# Patient Record
Sex: MALE | Race: OTHER | HISPANIC OR LATINO | URBAN - METROPOLITAN AREA
[De-identification: names, ages, dates, MRNs, and addresses within clinical notes are randomized per-mention and may not be internally consistent; named-entity substitution may affect disease eponyms.]

---

## 2017-10-13 ENCOUNTER — EMERGENCY (EMERGENCY)
Facility: HOSPITAL | Age: 52
LOS: 1 days | Discharge: HOME | End: 2017-10-13
Admitting: EMERGENCY MEDICINE
Payer: MEDICAID

## 2017-10-13 VITALS
TEMPERATURE: 98 F | HEART RATE: 61 BPM | DIASTOLIC BLOOD PRESSURE: 84 MMHG | SYSTOLIC BLOOD PRESSURE: 151 MMHG | RESPIRATION RATE: 18 BRPM | OXYGEN SATURATION: 98 %

## 2017-10-13 DIAGNOSIS — I25.10 ATHEROSCLEROTIC HEART DISEASE OF NATIVE CORONARY ARTERY WITHOUT ANGINA PECTORIS: ICD-10-CM

## 2017-10-13 DIAGNOSIS — K57.92 DIVERTICULITIS OF INTESTINE, PART UNSPECIFIED, WITHOUT PERFORATION OR ABSCESS WITHOUT BLEEDING: ICD-10-CM

## 2017-10-13 DIAGNOSIS — N23 UNSPECIFIED RENAL COLIC: ICD-10-CM

## 2017-10-13 DIAGNOSIS — Z95.5 PRESENCE OF CORONARY ANGIOPLASTY IMPLANT AND GRAFT: Chronic | ICD-10-CM

## 2017-10-13 DIAGNOSIS — I10 ESSENTIAL (PRIMARY) HYPERTENSION: ICD-10-CM

## 2017-10-13 DIAGNOSIS — F17.200 NICOTINE DEPENDENCE, UNSPECIFIED, UNCOMPLICATED: ICD-10-CM

## 2017-10-13 DIAGNOSIS — R10.32 LEFT LOWER QUADRANT PAIN: ICD-10-CM

## 2017-10-13 DIAGNOSIS — E78.5 HYPERLIPIDEMIA, UNSPECIFIED: ICD-10-CM

## 2017-10-13 LAB
ALBUMIN SERPL ELPH-MCNC: 3.9 G/DL — SIGNIFICANT CHANGE UP (ref 3.4–5)
ALP SERPL-CCNC: 66 U/L — SIGNIFICANT CHANGE UP (ref 40–120)
ALT FLD-CCNC: 27 U/L — SIGNIFICANT CHANGE UP (ref 12–42)
ANION GAP SERPL CALC-SCNC: 4 MMOL/L — LOW (ref 9–16)
AST SERPL-CCNC: 19 U/L — SIGNIFICANT CHANGE UP (ref 15–37)
BILIRUB SERPL-MCNC: 0.5 MG/DL — SIGNIFICANT CHANGE UP (ref 0.2–1.2)
BUN SERPL-MCNC: 12 MG/DL — SIGNIFICANT CHANGE UP (ref 7–23)
CALCIUM SERPL-MCNC: 8.9 MG/DL — SIGNIFICANT CHANGE UP (ref 8.5–10.5)
CHLORIDE SERPL-SCNC: 105 MMOL/L — SIGNIFICANT CHANGE UP (ref 96–108)
CO2 SERPL-SCNC: 30 MMOL/L — SIGNIFICANT CHANGE UP (ref 22–31)
CREAT SERPL-MCNC: 1.15 MG/DL — SIGNIFICANT CHANGE UP (ref 0.5–1.3)
GLUCOSE SERPL-MCNC: 126 MG/DL — HIGH (ref 70–99)
HCT VFR BLD CALC: 45.5 % — SIGNIFICANT CHANGE UP (ref 39–50)
HGB BLD-MCNC: 14.8 G/DL — SIGNIFICANT CHANGE UP (ref 13–17)
MCHC RBC-ENTMCNC: 30.7 PG — SIGNIFICANT CHANGE UP (ref 27–34)
MCHC RBC-ENTMCNC: 32.5 G/DL — SIGNIFICANT CHANGE UP (ref 32–36)
MCV RBC AUTO: 94.4 FL — SIGNIFICANT CHANGE UP (ref 80–100)
PLATELET # BLD AUTO: 251 K/UL — SIGNIFICANT CHANGE UP (ref 150–400)
POTASSIUM SERPL-MCNC: 4.5 MMOL/L — SIGNIFICANT CHANGE UP (ref 3.5–5.3)
POTASSIUM SERPL-SCNC: 4.5 MMOL/L — SIGNIFICANT CHANGE UP (ref 3.5–5.3)
PROT SERPL-MCNC: 7.8 G/DL — SIGNIFICANT CHANGE UP (ref 6.4–8.2)
RBC # BLD: 4.82 M/UL — SIGNIFICANT CHANGE UP (ref 4.2–5.8)
RBC # FLD: 14.2 % — SIGNIFICANT CHANGE UP (ref 10.3–16.9)
SODIUM SERPL-SCNC: 139 MMOL/L — SIGNIFICANT CHANGE UP (ref 132–145)
WBC # BLD: 10.2 K/UL — SIGNIFICANT CHANGE UP (ref 3.8–10.5)
WBC # FLD AUTO: 10.2 K/UL — SIGNIFICANT CHANGE UP (ref 3.8–10.5)

## 2017-10-13 PROCEDURE — 99285 EMERGENCY DEPT VISIT HI MDM: CPT

## 2017-10-13 PROCEDURE — 74176 CT ABD & PELVIS W/O CONTRAST: CPT | Mod: 26

## 2017-10-13 RX ORDER — ONDANSETRON 8 MG/1
1 TABLET, FILM COATED ORAL
Qty: 12 | Refills: 0 | OUTPATIENT
Start: 2017-10-13

## 2017-10-13 RX ORDER — TAMSULOSIN HYDROCHLORIDE 0.4 MG/1
0.4 CAPSULE ORAL ONCE
Qty: 0 | Refills: 0 | Status: COMPLETED | OUTPATIENT
Start: 2017-10-13 | End: 2017-10-13

## 2017-10-13 RX ORDER — OXYCODONE AND ACETAMINOPHEN 5; 325 MG/1; MG/1
1 TABLET ORAL ONCE
Qty: 0 | Refills: 0 | Status: DISCONTINUED | OUTPATIENT
Start: 2017-10-13 | End: 2017-10-13

## 2017-10-13 RX ORDER — TAMSULOSIN HYDROCHLORIDE 0.4 MG/1
1 CAPSULE ORAL
Qty: 15 | Refills: 0
Start: 2017-10-13 | End: 2017-11-12

## 2017-10-13 RX ORDER — ONDANSETRON 8 MG/1
4 TABLET, FILM COATED ORAL ONCE
Qty: 0 | Refills: 0 | Status: COMPLETED | OUTPATIENT
Start: 2017-10-13 | End: 2017-10-13

## 2017-10-13 RX ORDER — KETOROLAC TROMETHAMINE 30 MG/ML
30 SYRINGE (ML) INJECTION ONCE
Qty: 0 | Refills: 0 | Status: DISCONTINUED | OUTPATIENT
Start: 2017-10-13 | End: 2017-10-13

## 2017-10-13 RX ORDER — SODIUM CHLORIDE 9 MG/ML
1000 INJECTION INTRAMUSCULAR; INTRAVENOUS; SUBCUTANEOUS ONCE
Qty: 0 | Refills: 0 | Status: COMPLETED | OUTPATIENT
Start: 2017-10-13 | End: 2017-10-13

## 2017-10-13 RX ORDER — METRONIDAZOLE 500 MG
500 TABLET ORAL ONCE
Qty: 0 | Refills: 0 | Status: COMPLETED | OUTPATIENT
Start: 2017-10-13 | End: 2017-10-13

## 2017-10-13 RX ORDER — METRONIDAZOLE 500 MG
1 TABLET ORAL
Qty: 21 | Refills: 0
Start: 2017-10-13 | End: 2017-10-20

## 2017-10-13 RX ADMIN — Medication 500 MILLIGRAM(S): at 15:41

## 2017-10-13 RX ADMIN — ONDANSETRON 4 MILLIGRAM(S): 8 TABLET, FILM COATED ORAL at 15:01

## 2017-10-13 RX ADMIN — Medication 30 MILLIGRAM(S): at 15:02

## 2017-10-13 RX ADMIN — TAMSULOSIN HYDROCHLORIDE 0.4 MILLIGRAM(S): 0.4 CAPSULE ORAL at 15:01

## 2017-10-13 RX ADMIN — SODIUM CHLORIDE 2000 MILLILITER(S): 9 INJECTION INTRAMUSCULAR; INTRAVENOUS; SUBCUTANEOUS at 15:02

## 2017-10-13 RX ADMIN — OXYCODONE AND ACETAMINOPHEN 1 TABLET(S): 5; 325 TABLET ORAL at 15:41

## 2017-10-13 NOTE — ED PROVIDER NOTE - PHYSICAL EXAMINATION
Gen - WDWN M, writhing in pain, no respiratory distress    Skin - warm, dry, intact  HEENT - AT/NC, PERRL, EOMI, no conjunctival injection, o/p clear with no erythema, edema, or exudate, uvula midline, airway patent, neck supple, NT, FROM   CV - S1S2, R/R/R  Resp - respiration non-labored, CTAB, symmetric bs b/l, no r/r/w  GI - NABS, soft, ND, NT, no rebound or guarding, +L CVAT   MS - w/w/p, no c/c/e, calves supple and NT, distal pulses symmetric b/l   Neuro - AxOx3, no focal neuro deficits, ambulatory without gait disturbance Gen - WDWN M, writhing in pain, no respiratory distress    Skin - warm, dry, intact  HEENT - AT/NC, PERRL, EOMI, no conjunctival injection, o/p clear with no erythema, edema, or exudate, uvula midline, airway patent, neck supple, NT, FROM   CV - S1S2, R/R/R  Resp - respiration non-labored, CTAB, symmetric bs b/l, no r/r/w  GI - NABS, soft, ND, LLQ with TTP, no rebound or guarding, +L CVAT   MS - w/w/p, no c/c/e, calves supple and NT, distal pulses symmetric b/l   Neuro - AxOx3, no focal neuro deficits, ambulatory without gait disturbance

## 2017-10-13 NOTE — ED PROVIDER NOTE - MEDICAL DECISION MAKING DETAILS
pt with acute onset of L CVAT and LLQ pain today, AFVSS, labs unremarkable, CT with +0.2cm L distal urethral stone with acute diverticulitis without perforation, last C-scopy 3 yrs ago with unremarkable findings, pain adequately controlled in the ED, pt non-toxic appearing and hemodynamically stable, results, ddx, and f/u plans discussed with pt at bedside, d/c'd home to f/u with PMD, GI and urology, strict return precautions discussed, prompt return to ER for any worsening or new sx, pt verbalized understanding.

## 2017-10-13 NOTE — ED PROVIDER NOTE - OBJECTIVE STATEMENT
51 yo M with PMHx of MI, s/p stents on plavix, HTN, HLD, renal stones, requiring lithotripsy 10 yrs ago, bipolar d/o,  presenting c/o L flank pain and dysuria since this morning.  Pt woke up with acute onset of sharp pain in the L flank region, radiating to the LLQ with associated dysuria.  Denies fever, chills, hematuria, penile d/c, abdominal pain, change in bowel function, rash, HA, dizziness, SOB, CP, palpitations, diaphoresis, cough, and malaise. 53 yo M with PMHx of MI, s/p stents on plavix, HTN, HLD, renal stones, requiring lithotripsy 10 yrs ago, bipolar d/o,  presenting c/o L flank pain and dysuria since this morning.  Pt woke up with acute onset of sharp pain in the L flank region, radiating to the LLQ with associated dysuria.  Denies fever, chills, hematuria, penile d/c, abdominal pain, change in bowel function, rash, HA, dizziness, SOB, CP, palpitations, diaphoresis, cough, and malaise. Last EGD/C-scopy 3 yrs ago with unremarkable findings

## 2017-10-13 NOTE — ED BEHAVIORAL HEALTH NOTE - BEHAVIORAL HEALTH NOTE
Sw meet with patient to discuss options of health home services. However at this time the patient has declined services. worker made available for any further assistance needed.

## 2017-10-15 PROBLEM — Z00.00 ENCOUNTER FOR PREVENTIVE HEALTH EXAMINATION: Status: ACTIVE | Noted: 2017-10-15

## 2018-01-09 ENCOUNTER — APPOINTMENT (OUTPATIENT)
Dept: DERMATOLOGY | Facility: CLINIC | Age: 53
End: 2018-01-09

## 2018-03-01 ENCOUNTER — OUTPATIENT (OUTPATIENT)
Dept: OUTPATIENT SERVICES | Facility: HOSPITAL | Age: 53
LOS: 1 days | Discharge: HOME | End: 2018-03-01

## 2018-03-01 DIAGNOSIS — Z95.5 PRESENCE OF CORONARY ANGIOPLASTY IMPLANT AND GRAFT: Chronic | ICD-10-CM

## 2018-03-01 DIAGNOSIS — I10 ESSENTIAL (PRIMARY) HYPERTENSION: ICD-10-CM

## 2018-03-01 DIAGNOSIS — I25.10 ATHEROSCLEROTIC HEART DISEASE OF NATIVE CORONARY ARTERY WITHOUT ANGINA PECTORIS: ICD-10-CM

## 2018-04-10 ENCOUNTER — APPOINTMENT (OUTPATIENT)
Dept: DERMATOLOGY | Facility: CLINIC | Age: 53
End: 2018-04-10

## 2021-07-01 ENCOUNTER — OUTPATIENT (OUTPATIENT)
Dept: OUTPATIENT SERVICES | Facility: HOSPITAL | Age: 56
LOS: 1 days | End: 2021-07-01
Payer: MEDICAID

## 2021-07-01 DIAGNOSIS — Z95.5 PRESENCE OF CORONARY ANGIOPLASTY IMPLANT AND GRAFT: Chronic | ICD-10-CM

## 2021-07-07 ENCOUNTER — INPATIENT (INPATIENT)
Facility: HOSPITAL | Age: 56
LOS: 1 days | Discharge: HOME HEALTH | End: 2021-07-09
Attending: INTERNAL MEDICINE | Admitting: INTERNAL MEDICINE
Payer: MEDICAID

## 2021-07-07 VITALS — RESPIRATION RATE: 18 BRPM | TEMPERATURE: 98 F | OXYGEN SATURATION: 99 %

## 2021-07-07 DIAGNOSIS — Z95.5 PRESENCE OF CORONARY ANGIOPLASTY IMPLANT AND GRAFT: Chronic | ICD-10-CM

## 2021-07-07 LAB
ALBUMIN SERPL ELPH-MCNC: 4.1 G/DL — SIGNIFICANT CHANGE UP (ref 3.5–5.2)
ALP SERPL-CCNC: 69 U/L — SIGNIFICANT CHANGE UP (ref 30–115)
ALT FLD-CCNC: 25 U/L — SIGNIFICANT CHANGE UP (ref 0–41)
ANION GAP SERPL CALC-SCNC: 7 MMOL/L — SIGNIFICANT CHANGE UP (ref 7–14)
APTT BLD: 32.8 SEC — SIGNIFICANT CHANGE UP (ref 27–39.2)
APTT BLD: 36.4 SEC — SIGNIFICANT CHANGE UP (ref 27–39.2)
AST SERPL-CCNC: 19 U/L — SIGNIFICANT CHANGE UP (ref 0–41)
BASOPHILS # BLD AUTO: 0.04 K/UL — SIGNIFICANT CHANGE UP (ref 0–0.2)
BASOPHILS NFR BLD AUTO: 0.4 % — SIGNIFICANT CHANGE UP (ref 0–1)
BILIRUB SERPL-MCNC: 0.3 MG/DL — SIGNIFICANT CHANGE UP (ref 0.2–1.2)
BUN SERPL-MCNC: 10 MG/DL — SIGNIFICANT CHANGE UP (ref 10–20)
CALCIUM SERPL-MCNC: 9.1 MG/DL — SIGNIFICANT CHANGE UP (ref 8.5–10.1)
CHLORIDE SERPL-SCNC: 103 MMOL/L — SIGNIFICANT CHANGE UP (ref 98–110)
CO2 SERPL-SCNC: 28 MMOL/L — SIGNIFICANT CHANGE UP (ref 17–32)
CREAT SERPL-MCNC: 1 MG/DL — SIGNIFICANT CHANGE UP (ref 0.7–1.5)
EOSINOPHIL # BLD AUTO: 0.19 K/UL — SIGNIFICANT CHANGE UP (ref 0–0.7)
EOSINOPHIL NFR BLD AUTO: 1.9 % — SIGNIFICANT CHANGE UP (ref 0–8)
GLUCOSE BLDC GLUCOMTR-MCNC: 99 MG/DL — SIGNIFICANT CHANGE UP (ref 70–99)
GLUCOSE SERPL-MCNC: 101 MG/DL — HIGH (ref 70–99)
HCT VFR BLD CALC: 45.5 % — SIGNIFICANT CHANGE UP (ref 42–52)
HGB BLD-MCNC: 14.5 G/DL — SIGNIFICANT CHANGE UP (ref 14–18)
IMM GRANULOCYTES NFR BLD AUTO: 1.1 % — HIGH (ref 0.1–0.3)
INR BLD: 1.01 RATIO — SIGNIFICANT CHANGE UP (ref 0.65–1.3)
LYMPHOCYTES # BLD AUTO: 2.92 K/UL — SIGNIFICANT CHANGE UP (ref 1.2–3.4)
LYMPHOCYTES # BLD AUTO: 28.9 % — SIGNIFICANT CHANGE UP (ref 20.5–51.1)
MAGNESIUM SERPL-MCNC: 2 MG/DL — SIGNIFICANT CHANGE UP (ref 1.8–2.4)
MCHC RBC-ENTMCNC: 29.7 PG — SIGNIFICANT CHANGE UP (ref 27–31)
MCHC RBC-ENTMCNC: 31.9 G/DL — LOW (ref 32–37)
MCV RBC AUTO: 93 FL — SIGNIFICANT CHANGE UP (ref 80–94)
MONOCYTES # BLD AUTO: 0.79 K/UL — HIGH (ref 0.1–0.6)
MONOCYTES NFR BLD AUTO: 7.8 % — SIGNIFICANT CHANGE UP (ref 1.7–9.3)
NEUTROPHILS # BLD AUTO: 6.04 K/UL — SIGNIFICANT CHANGE UP (ref 1.4–6.5)
NEUTROPHILS NFR BLD AUTO: 59.9 % — SIGNIFICANT CHANGE UP (ref 42.2–75.2)
NRBC # BLD: 0 /100 WBCS — SIGNIFICANT CHANGE UP (ref 0–0)
NT-PROBNP SERPL-SCNC: 109 PG/ML — SIGNIFICANT CHANGE UP (ref 0–300)
PLATELET # BLD AUTO: 323 K/UL — SIGNIFICANT CHANGE UP (ref 130–400)
POTASSIUM SERPL-MCNC: 4.6 MMOL/L — SIGNIFICANT CHANGE UP (ref 3.5–5)
POTASSIUM SERPL-SCNC: 4.6 MMOL/L — SIGNIFICANT CHANGE UP (ref 3.5–5)
PROT SERPL-MCNC: 7 G/DL — SIGNIFICANT CHANGE UP (ref 6–8)
PROTHROM AB SERPL-ACNC: 11.6 SEC — SIGNIFICANT CHANGE UP (ref 9.95–12.87)
RBC # BLD: 4.89 M/UL — SIGNIFICANT CHANGE UP (ref 4.7–6.1)
RBC # FLD: 13.1 % — SIGNIFICANT CHANGE UP (ref 11.5–14.5)
SARS-COV-2 RNA SPEC QL NAA+PROBE: SIGNIFICANT CHANGE UP
SODIUM SERPL-SCNC: 138 MMOL/L — SIGNIFICANT CHANGE UP (ref 135–146)
TROPONIN T SERPL-MCNC: 0.01 NG/ML — SIGNIFICANT CHANGE UP
TROPONIN T SERPL-MCNC: <0.01 NG/ML — SIGNIFICANT CHANGE UP
WBC # BLD: 10.09 K/UL — SIGNIFICANT CHANGE UP (ref 4.8–10.8)
WBC # FLD AUTO: 10.09 K/UL — SIGNIFICANT CHANGE UP (ref 4.8–10.8)

## 2021-07-07 PROCEDURE — 92928 PRQ TCAT PLMT NTRAC ST 1 LES: CPT | Mod: RC,59

## 2021-07-07 PROCEDURE — 99222 1ST HOSP IP/OBS MODERATE 55: CPT

## 2021-07-07 PROCEDURE — 71045 X-RAY EXAM CHEST 1 VIEW: CPT | Mod: 26

## 2021-07-07 PROCEDURE — 99291 CRITICAL CARE FIRST HOUR: CPT

## 2021-07-07 PROCEDURE — 93458 L HRT ARTERY/VENTRICLE ANGIO: CPT | Mod: 26,XU

## 2021-07-07 PROCEDURE — 99222 1ST HOSP IP/OBS MODERATE 55: CPT | Mod: 57

## 2021-07-07 RX ORDER — HEPARIN SODIUM 5000 [USP'U]/ML
INJECTION INTRAVENOUS; SUBCUTANEOUS
Qty: 25000 | Refills: 0 | Status: DISCONTINUED | OUTPATIENT
Start: 2021-07-07 | End: 2021-07-07

## 2021-07-07 RX ORDER — CHLORHEXIDINE GLUCONATE 213 G/1000ML
1 SOLUTION TOPICAL
Refills: 0 | Status: DISCONTINUED | OUTPATIENT
Start: 2021-07-07 | End: 2021-07-09

## 2021-07-07 RX ORDER — METOPROLOL TARTRATE 50 MG
25 TABLET ORAL EVERY 12 HOURS
Refills: 0 | Status: DISCONTINUED | OUTPATIENT
Start: 2021-07-07 | End: 2021-07-08

## 2021-07-07 RX ORDER — SODIUM CHLORIDE 9 MG/ML
1000 INJECTION INTRAMUSCULAR; INTRAVENOUS; SUBCUTANEOUS
Refills: 0 | Status: DISCONTINUED | OUTPATIENT
Start: 2021-07-07 | End: 2021-07-07

## 2021-07-07 RX ORDER — CLOPIDOGREL BISULFATE 75 MG/1
300 TABLET, FILM COATED ORAL ONCE
Refills: 0 | Status: COMPLETED | OUTPATIENT
Start: 2021-07-07 | End: 2021-07-07

## 2021-07-07 RX ORDER — ASPIRIN/CALCIUM CARB/MAGNESIUM 324 MG
81 TABLET ORAL DAILY
Refills: 0 | Status: DISCONTINUED | OUTPATIENT
Start: 2021-07-08 | End: 2021-07-09

## 2021-07-07 RX ORDER — NITROGLYCERIN 6.5 MG
5 CAPSULE, EXTENDED RELEASE ORAL
Qty: 50 | Refills: 0 | Status: DISCONTINUED | OUTPATIENT
Start: 2021-07-07 | End: 2021-07-07

## 2021-07-07 RX ORDER — ASPIRIN/CALCIUM CARB/MAGNESIUM 324 MG
81 TABLET ORAL DAILY
Refills: 0 | Status: DISCONTINUED | OUTPATIENT
Start: 2021-07-07 | End: 2021-07-07

## 2021-07-07 RX ORDER — LISINOPRIL 2.5 MG/1
2.5 TABLET ORAL DAILY
Refills: 0 | Status: DISCONTINUED | OUTPATIENT
Start: 2021-07-07 | End: 2021-07-09

## 2021-07-07 RX ORDER — SODIUM CHLORIDE 9 MG/ML
1000 INJECTION INTRAMUSCULAR; INTRAVENOUS; SUBCUTANEOUS
Refills: 0 | Status: DISCONTINUED | OUTPATIENT
Start: 2021-07-07 | End: 2021-07-09

## 2021-07-07 RX ORDER — ATORVASTATIN CALCIUM 80 MG/1
80 TABLET, FILM COATED ORAL AT BEDTIME
Refills: 0 | Status: DISCONTINUED | OUTPATIENT
Start: 2021-07-07 | End: 2021-07-09

## 2021-07-07 RX ORDER — PRASUGREL 5 MG/1
10 TABLET, FILM COATED ORAL DAILY
Refills: 0 | Status: DISCONTINUED | OUTPATIENT
Start: 2021-07-08 | End: 2021-07-09

## 2021-07-07 RX ORDER — TAMSULOSIN HYDROCHLORIDE 0.4 MG/1
0.4 CAPSULE ORAL AT BEDTIME
Refills: 0 | Status: DISCONTINUED | OUTPATIENT
Start: 2021-07-07 | End: 2021-07-09

## 2021-07-07 RX ADMIN — TAMSULOSIN HYDROCHLORIDE 0.4 MILLIGRAM(S): 0.4 CAPSULE ORAL at 23:45

## 2021-07-07 RX ADMIN — ATORVASTATIN CALCIUM 80 MILLIGRAM(S): 80 TABLET, FILM COATED ORAL at 23:43

## 2021-07-07 RX ADMIN — HEPARIN SODIUM 1700 UNIT(S)/HR: 5000 INJECTION INTRAVENOUS; SUBCUTANEOUS at 19:52

## 2021-07-07 RX ADMIN — SODIUM CHLORIDE 150 MILLILITER(S): 9 INJECTION INTRAMUSCULAR; INTRAVENOUS; SUBCUTANEOUS at 23:00

## 2021-07-07 RX ADMIN — CLOPIDOGREL BISULFATE 300 MILLIGRAM(S): 75 TABLET, FILM COATED ORAL at 17:49

## 2021-07-07 RX ADMIN — Medication 1.5 MICROGRAM(S)/MIN: at 17:49

## 2021-07-07 NOTE — H&P ADULT - ASSESSMENT
IMPRESSION:  UA/ACS  HTN  DLD  Hx of CAD s/p stent to RCA in 2011    PLAN:    CNS: Avoid sedation     HEENT:  Oral care    PULMONARY:  HOB @ 45 degrees, Keep SPO2>94%    CARDIOVASCULAR:    GI: GI prophylaxis                                          Feeding     RENAL:  F/u  lytes.  Correct as needed. accurate I/O    INFECTIOUS DISEASE:    HEMATOLOGICAL:  DVT prophylaxis.    ENDOCRINE:  Follow up FS.  Insulin protocol if needed.    MUSCULOSKELETAL:    CODE STATUS: FULL CODE    DISPOSITION: Pt requires continued monitoring in the MICU     IMPRESSION:  UA/ACS  HTN  DLD  Hx of CAD s/p stent to RCA in 2011  BPH    PLAN:    CNS: Avoid sedation     HEENT:  Oral care    PULMONARY:  HOB @ 45 degrees, Keep SPO2>94%    CARDIOVASCULAR:  - STEMI cancelled but due to active chest pain, ischemic changes on ECG and history of early CAD (and aspirin and statin stopped), will take the patient to the cath lab today  - Loading dose of aspirin 325mg  - Continue Plavix 75mg daily  - Start heparin drip; monitor PTT closely and keep in range of anticoagulation following ACS protocol  - Start Nitro drip for chest pain and HTN  - Admit to CCU  - Check cardiac enzymes, CMP  - Trend cardiac enzymes and check serial ECGs  - Start Atorvastatin 80mg daily  - Continue home dose of beta-blockers for now  - Further recommendations based on cardiac cath findings    GI: GI prophylaxis: Protonix         Feeding: NPO until cath     RENAL:  F/u  lytes.  Correct as needed. accurate I/O    INFECTIOUS DISEASE: No indications for Abx    HEMATOLOGICAL:  DVT prophylaxis on heparin gtt     ENDOCRINE:  Follow up FS.  Insulin protocol if needed.    MUSCULOSKELETAL: Bedrest for now     CODE STATUS: FULL CODE    DISPOSITION: CCU

## 2021-07-07 NOTE — ED PROVIDER NOTE - CLINICAL SUMMARY MEDICAL DECISION MAKING FREE TEXT BOX
a/p  56 yr old m that presents with chest pain   -pt called as a STEMI   -labs  -ekg  -cardiology at bedside  -asa, plavix, nitro, heparin   -admit to ccu

## 2021-07-07 NOTE — H&P ADULT - NSHPLABSRESULTS_GEN_ALL_CORE
14.5   10.09 )-----------( 323      ( 07 Jul 2021 17:32 )             45.5       07-07    138  |  103  |  10  ----------------------------<  101<H>  4.6   |  28  |  1.0    Ca    9.1      07 Jul 2021 17:32  Mg     2.0     07-07    TPro  7.0  /  Alb  4.1  /  TBili  0.3  /  DBili  x   /  AST  19  /  ALT  25  /  AlkPhos  69  07-07                  PT/INR - ( 07 Jul 2021 17:41 )   PT: 11.60 sec;   INR: 1.01 ratio         PTT - ( 07 Jul 2021 17:41 )  PTT:32.8 sec    Lactate Trend      CARDIAC MARKERS ( 07 Jul 2021 17:32 )  x     / <0.01 ng/mL / x     / x     / x            CAPILLARY BLOOD GLUCOSE

## 2021-07-07 NOTE — H&P ADULT - NSICDXPASTMEDICALHX_GEN_ALL_CORE_FT
Patient has been ordered for a coronary CTA and this imaging study is now complete. Practice breath hold returned a HR = 46. The coronary CTA imaging study process was reviewed with the patient and she was offered an opportunity to ask questions. Patient was agreeable to proceed. Patient did well during the imaging study. Patient required no additional beta blocker to manage heart rate. NTG SL was administered per protocol (See MAR). Patient will be returned to her inpatient room.       Thank you,    Javid Robertson, JOSE ALEJANDRON, RN PAST MEDICAL HISTORY:  CAD (coronary artery disease)     HLD (hyperlipidemia)     HTN (hypertension)     Renal stone

## 2021-07-07 NOTE — CHART NOTE - NSCHARTNOTEFT_GEN_A_CORE
Code STEMI called in the ED. I immediately responded to the code STEMI and evaluated the patient at bedside. ECG reviewed and case discussed with interventional cardiologist on call (Dr. Hansen). Code STEMI cancelled. However, will take patient to the cath lab tonight for active chest pain and ischemic changes. See consult note for details

## 2021-07-07 NOTE — ED PROVIDER NOTE - PROGRESS NOTE DETAILS
I was directly involved in the care of this patient. PA Fellow Tanvi note/plan reviewed and agreed. approved to ccu, admit to [facility], icu resident. per cards, give plavix, heparin ptt, nitro ptt. plan for cath tonight.

## 2021-07-07 NOTE — CONSULT NOTE ADULT - SUBJECTIVE AND OBJECTIVE BOX
HPI:  56 y.o male patient with PMH of CAD s/p PCI to RCA in 2011, HLD, HTN presented to the ED with a 1-hour history of chest pain.  History goes back to 1 hour prior to presentation when the patient suddenly developed chest pain described as severe chest tightness, retrosternal radiating to the right side, associated with diaphoresis, similar to the pain he had in 2011 when he had PCI.  In EMS, he was given full dose of aspirin and nitro with slight improvement of chest pain.  He denies shortness of breath, abdominal pain, or any other significant symptoms.  He reports that while he was in [facility] in 12/2020, he was taken off of aspirin and simvastatin (unclear why).     PAST MEDICAL & SURGICAL HISTORY  Renal stone    HTN (hypertension)    HLD (hyperlipidemia)    CAD (coronary artery disease)    History of coronary artery stent placement        FAMILY HISTORY:  FAMILY HISTORY:      SOCIAL HISTORY:  [X]smoker  []Alcohol  []Drug    ALLERGIES:  No Known Allergies      MEDICATIONS:  MEDICATIONS  (STANDING):  nitroglycerin  Infusion 5 MICROgram(s)/Min (1.5 mL/Hr) IV Continuous <Continuous>    MEDICATIONS  (PRN):      HOME MEDICATIONS:  Home Medications:      VITALS:   T(F): 98 (07-07 @ 17:30), Max: 98 (07-07 @ 17:30)  HR: --  BP: --  BP(mean): --  RR: 18 (07-07 @ 17:30) (18 - 18)  SpO2: 99% (07-07 @ 17:30) (99% - 99%)    I&O's Summary      REVIEW OF SYSTEMS:  CONSTITUTIONAL: No weakness, fevers or chills  EYES: No visual changes  ENT: No vertigo or throat pain   NECK: No pain or stiffness  RESPIRATORY: No cough, wheezing, hemoptysis; No shortness of breath  CARDIOVASCULAR: Chest pain as described in HPI  GASTROINTESTINAL: No abdominal or epigastric pain. No nausea, vomiting, or hematemesis; No diarrhea or constipation. No melena or hematochezia.  GENITOURINARY: No dysuria, frequency or hematuria  NEUROLOGICAL: No numbness or weakness  SKIN: No itching, no rashes  MSK: No pain    PHYSICAL EXAM:  NEURO: patient is awake , alert and oriented  GEN: Not in acute distress  NECK: no thyroid enlargement, no JVD  LUNGS: Clear to auscultation bilaterally   CARDIOVASCULAR: S1/S2 present, RRR  ABD: Soft, non-tender, non-distended  EXT: No TACO  SKIN: Intact    LABS:                        14.5   10.09 )-----------( 323      ( 07 Jul 2021 17:32 )             45.5                     Troponin trend:            RADIOLOGY:  -CXR:  -TTE in 2011: EF 55-65%  -CCTA:  -STRESS TEST:  -CATHETERIZATION in 2011: distal RCA 90% stenosis s/p PCI with 3.0x28 Promus RX CHRIS, RPDA 80% stenosis s/p PCI with 2.75 x 18 Promus RX CHRIS    ECG: NSR, inferior q waves, slight ST elevation in inferior leads

## 2021-07-07 NOTE — ED PROVIDER NOTE - OBJECTIVE STATEMENT
57 yo male, pmh of cad s/p stents, htn, hld, presents to ed for right sided cp, started 20 mins ago, mild, aching, no radiation, got asa 325 and 2 nitro strips in field. denies fever, chills, sob, le swelling, abd pain, nvd, dizziness. pt rx plavix.

## 2021-07-07 NOTE — H&P ADULT - HISTORY OF PRESENT ILLNESS
This is a 56 year old male patient with PMHx of CAD s/p PCI to RCA in 2011, HLD, HTN presented to the ED with a 1-hour history of chest pain. History goes back to 1 hour prior to presentation when the patient suddenly developed chest pain described as severe chest tightness, retrosternal radiating to the right side, associated with diaphoresis, similar to the pain he had in 2011 when he had PCI. In EMS, he was given full dose of aspirin and nitro with slight improvement of chest pain.  He denies shortness of breath, abdominal pain, or any other significant symptoms. He reports that while he was in assisted in 12/2020, he was taken off of aspirin and simvastatin at that time and did not restart taking these medications after he was released.     Patient was evaluated in the ED, still complaining of chest tightness ( slightly improved after starting the nitro drip ).     In the ED Vital Signs Last 24 Hrs  T(C): 36.7 (07 Jul 2021 17:30), Max: 36.7 (07 Jul 2021 17:30)  T(F): 98 (07 Jul 2021 17:30), Max: 98 (07 Jul 2021 17:30)  BP(mean): --  RR: 18 (07 Jul 2021 17:30) (18 - 18)  SpO2: 99% (07 Jul 2021 17:30) (99% - 99%)

## 2021-07-07 NOTE — H&P ADULT - NSHPPHYSICALEXAM_GEN_ALL_CORE
General: in mild distress due to chest pain   Head and neck: NC, AT, No JVD  Heart: S1,S2 appreciated, no added sounds  Lungs: clear bilaterally, no added sounds  Abdomen: soft, nondistended, nontender  LE: no edema  Neuro: nonfocal

## 2021-07-07 NOTE — CONSULT NOTE ADULT - ASSESSMENT
56 y.o male patient with PMH of CAD s/p PCI to RCA in 2011, HLD, HTN presented to the ED with a 1-hour history of chest pain.    # Chest pain  - STEMI cancelled but due to active chest pain, ischemic changes on ECG and history of early CAD (and aspirin and statin stopped recently), will take the patient to the cath lab today  - Loading dose of aspirin 325mg  - Continue Plavix 75mg daily  - Start heparin drip; monitor PTT closely and keep in range of anticoagulation following ACS protocol  - Start Nitro drip for chest pain and HTN  - Admit to CCU  - Check cardiac enzymes, CMP  - Patient with allergy to contrast; give Prednisone 60mg PO  - Trend cardiac enzymes and check serial ECGs  - Start Atorvastatin 80mg daily  - Continue home dose of beta-blockers for now  - Further recommendations based on cardiac cath findings 56 y.o male patient with PMH of CAD s/p PCI to RCA in 2011, HLD, HTN presented to the ED with a 1-hour history of chest pain.    # Chest pain  - STEMI cancelled but due to active chest pain, ischemic changes on ECG and history of early CAD (and aspirin and statin stopped), will take the patient to the cath lab today  - Loading dose of aspirin 325mg  - Continue Plavix 75mg daily  - Start heparin drip; monitor PTT closely and keep in range of anticoagulation following ACS protocol  - Start Nitro drip for chest pain and HTN  - Admit to CCU  - Check cardiac enzymes, CMP  - Trend cardiac enzymes and check serial ECGs  - Start Atorvastatin 80mg daily  - Continue home dose of beta-blockers for now  - Further recommendations based on cardiac cath findings

## 2021-07-07 NOTE — ED PROVIDER NOTE - CRITICAL CARE ATTENDING CONTRIBUTION TO CARE
56 yr old m w/ a pmh significant for CAD s/p stent, htn, hld, bipolar disorder who presents with chest pain. Pt states that the symptoms started a couple of hrs before presenting. Pt states that the pain is substernal and radiates to the R side. Pt denies any sob, nausea, vomiting, fevers, chills or any other medical complaints.     VITAL SIGNS: I have reviewed nursing notes and confirm.  CONSTITUTIONAL: non-toxic, well appearing  SKIN: no rash, no petechiae.  EYES: EOMI, pink conjunctiva, anicteric  ENT: tongue midline, no exudates, MMM  NECK: Supple; no meningismus, no JVD  CARD: RRR, no murmurs, equal radial pulses bilaterally 2+  RESP: CTAB, no respiratory distress  ABD: Soft, non-tender, non-distended, no peritoneal signs, no HSM, no CVA tenderness  EXT: Normal ROM x4. No edema. No calves tenderness  NEURO: Alert, oriented. CN2-12 intact, equal strength bilaterally.  PSYCH: Cooperative, appropriate.    a/p  56 yr old m that presents with chest pain   -pt called as a STEMI   -labs  -ekg  -cardiology at bedside  -asa, plavix, nitro, heparin   -admit to ccu

## 2021-07-08 PROBLEM — I25.10 ATHEROSCLEROTIC HEART DISEASE OF NATIVE CORONARY ARTERY WITHOUT ANGINA PECTORIS: Chronic | Status: ACTIVE | Noted: 2017-10-13

## 2021-07-08 PROBLEM — I10 ESSENTIAL (PRIMARY) HYPERTENSION: Chronic | Status: ACTIVE | Noted: 2017-10-13

## 2021-07-08 PROBLEM — E78.5 HYPERLIPIDEMIA, UNSPECIFIED: Chronic | Status: ACTIVE | Noted: 2017-10-13

## 2021-07-08 PROBLEM — N20.0 CALCULUS OF KIDNEY: Chronic | Status: ACTIVE | Noted: 2017-10-13

## 2021-07-08 LAB
A1C WITH ESTIMATED AVERAGE GLUCOSE RESULT: 6.6 % — HIGH (ref 4–5.6)
ALBUMIN SERPL ELPH-MCNC: 3.7 G/DL — SIGNIFICANT CHANGE UP (ref 3.5–5.2)
ALP SERPL-CCNC: 65 U/L — SIGNIFICANT CHANGE UP (ref 30–115)
ALT FLD-CCNC: 31 U/L — SIGNIFICANT CHANGE UP (ref 0–41)
ANION GAP SERPL CALC-SCNC: 9 MMOL/L — SIGNIFICANT CHANGE UP (ref 7–14)
APTT BLD: 172.5 SEC — CRITICAL HIGH (ref 27–39.2)
APTT BLD: 31.3 SEC — SIGNIFICANT CHANGE UP (ref 27–39.2)
AST SERPL-CCNC: 81 U/L — HIGH (ref 0–41)
BASOPHILS # BLD AUTO: 0.03 K/UL — SIGNIFICANT CHANGE UP (ref 0–0.2)
BASOPHILS NFR BLD AUTO: 0.3 % — SIGNIFICANT CHANGE UP (ref 0–1)
BILIRUB SERPL-MCNC: 0.5 MG/DL — SIGNIFICANT CHANGE UP (ref 0.2–1.2)
BUN SERPL-MCNC: 8 MG/DL — LOW (ref 10–20)
CALCIUM SERPL-MCNC: 8.6 MG/DL — SIGNIFICANT CHANGE UP (ref 8.5–10.1)
CHLORIDE SERPL-SCNC: 101 MMOL/L — SIGNIFICANT CHANGE UP (ref 98–110)
CHOLEST SERPL-MCNC: 195 MG/DL — SIGNIFICANT CHANGE UP
CO2 SERPL-SCNC: 24 MMOL/L — SIGNIFICANT CHANGE UP (ref 17–32)
COVID-19 SPIKE DOMAIN AB INTERP: POSITIVE
COVID-19 SPIKE DOMAIN ANTIBODY RESULT: >250 U/ML — HIGH
CREAT SERPL-MCNC: 0.9 MG/DL — SIGNIFICANT CHANGE UP (ref 0.7–1.5)
EOSINOPHIL # BLD AUTO: 0.1 K/UL — SIGNIFICANT CHANGE UP (ref 0–0.7)
EOSINOPHIL NFR BLD AUTO: 0.9 % — SIGNIFICANT CHANGE UP (ref 0–8)
ESTIMATED AVERAGE GLUCOSE: 143 MG/DL — HIGH (ref 68–114)
GLUCOSE SERPL-MCNC: 93 MG/DL — SIGNIFICANT CHANGE UP (ref 70–99)
HCT VFR BLD CALC: 43 % — SIGNIFICANT CHANGE UP (ref 42–52)
HCV AB S/CO SERPL IA: 0.03 COI — SIGNIFICANT CHANGE UP
HCV AB SERPL-IMP: SIGNIFICANT CHANGE UP
HDLC SERPL-MCNC: 30 MG/DL — LOW
HGB BLD-MCNC: 14.2 G/DL — SIGNIFICANT CHANGE UP (ref 14–18)
IMM GRANULOCYTES NFR BLD AUTO: 0.6 % — HIGH (ref 0.1–0.3)
LIPID PNL WITH DIRECT LDL SERPL: 127 MG/DL — HIGH
LYMPHOCYTES # BLD AUTO: 2.71 K/UL — SIGNIFICANT CHANGE UP (ref 1.2–3.4)
LYMPHOCYTES # BLD AUTO: 23.6 % — SIGNIFICANT CHANGE UP (ref 20.5–51.1)
MCHC RBC-ENTMCNC: 30.3 PG — SIGNIFICANT CHANGE UP (ref 27–31)
MCHC RBC-ENTMCNC: 33 G/DL — SIGNIFICANT CHANGE UP (ref 32–37)
MCV RBC AUTO: 91.9 FL — SIGNIFICANT CHANGE UP (ref 80–94)
MONOCYTES # BLD AUTO: 0.76 K/UL — HIGH (ref 0.1–0.6)
MONOCYTES NFR BLD AUTO: 6.6 % — SIGNIFICANT CHANGE UP (ref 1.7–9.3)
NEUTROPHILS # BLD AUTO: 7.82 K/UL — HIGH (ref 1.4–6.5)
NEUTROPHILS NFR BLD AUTO: 68 % — SIGNIFICANT CHANGE UP (ref 42.2–75.2)
NON HDL CHOLESTEROL: 165 MG/DL — HIGH
NRBC # BLD: 0 /100 WBCS — SIGNIFICANT CHANGE UP (ref 0–0)
PLATELET # BLD AUTO: 298 K/UL — SIGNIFICANT CHANGE UP (ref 130–400)
POTASSIUM SERPL-MCNC: 4.2 MMOL/L — SIGNIFICANT CHANGE UP (ref 3.5–5)
POTASSIUM SERPL-SCNC: 4.2 MMOL/L — SIGNIFICANT CHANGE UP (ref 3.5–5)
PROT SERPL-MCNC: 6.4 G/DL — SIGNIFICANT CHANGE UP (ref 6–8)
RBC # BLD: 4.68 M/UL — LOW (ref 4.7–6.1)
RBC # FLD: 13.2 % — SIGNIFICANT CHANGE UP (ref 11.5–14.5)
SARS-COV-2 IGG+IGM SERPL QL IA: >250 U/ML — HIGH
SARS-COV-2 IGG+IGM SERPL QL IA: POSITIVE
SODIUM SERPL-SCNC: 134 MMOL/L — LOW (ref 135–146)
TRIGL SERPL-MCNC: 299 MG/DL — HIGH
TROPONIN T SERPL-MCNC: 1.8 NG/ML — CRITICAL HIGH
TROPONIN T SERPL-MCNC: 1.99 NG/ML — CRITICAL HIGH
WBC # BLD: 11.49 K/UL — HIGH (ref 4.8–10.8)
WBC # FLD AUTO: 11.49 K/UL — HIGH (ref 4.8–10.8)

## 2021-07-08 PROCEDURE — 99231 SBSQ HOSP IP/OBS SF/LOW 25: CPT

## 2021-07-08 PROCEDURE — 93306 TTE W/DOPPLER COMPLETE: CPT | Mod: 26

## 2021-07-08 PROCEDURE — 99232 SBSQ HOSP IP/OBS MODERATE 35: CPT

## 2021-07-08 PROCEDURE — 93010 ELECTROCARDIOGRAM REPORT: CPT

## 2021-07-08 RX ORDER — CLOPIDOGREL BISULFATE 75 MG/1
1 TABLET, FILM COATED ORAL
Qty: 0 | Refills: 0 | DISCHARGE

## 2021-07-08 RX ORDER — METOPROLOL TARTRATE 50 MG
25 TABLET ORAL EVERY 8 HOURS
Refills: 0 | Status: DISCONTINUED | OUTPATIENT
Start: 2021-07-08 | End: 2021-07-09

## 2021-07-08 RX ORDER — PRASUGREL 5 MG/1
1 TABLET, FILM COATED ORAL
Qty: 30 | Refills: 1
Start: 2021-07-08 | End: 2021-09-05

## 2021-07-08 RX ORDER — ENOXAPARIN SODIUM 100 MG/ML
40 INJECTION SUBCUTANEOUS AT BEDTIME
Refills: 0 | Status: DISCONTINUED | OUTPATIENT
Start: 2021-07-08 | End: 2021-07-09

## 2021-07-08 RX ADMIN — ATORVASTATIN CALCIUM 80 MILLIGRAM(S): 80 TABLET, FILM COATED ORAL at 21:09

## 2021-07-08 RX ADMIN — Medication 25 MILLIGRAM(S): at 05:34

## 2021-07-08 RX ADMIN — PRASUGREL 10 MILLIGRAM(S): 5 TABLET, FILM COATED ORAL at 12:05

## 2021-07-08 RX ADMIN — Medication 81 MILLIGRAM(S): at 12:06

## 2021-07-08 RX ADMIN — TAMSULOSIN HYDROCHLORIDE 0.4 MILLIGRAM(S): 0.4 CAPSULE ORAL at 21:09

## 2021-07-08 RX ADMIN — CHLORHEXIDINE GLUCONATE 1 APPLICATION(S): 213 SOLUTION TOPICAL at 05:33

## 2021-07-08 RX ADMIN — ENOXAPARIN SODIUM 40 MILLIGRAM(S): 100 INJECTION SUBCUTANEOUS at 21:09

## 2021-07-08 RX ADMIN — Medication 25 MILLIGRAM(S): at 13:46

## 2021-07-08 RX ADMIN — LISINOPRIL 2.5 MILLIGRAM(S): 2.5 TABLET ORAL at 05:34

## 2021-07-08 RX ADMIN — Medication 25 MILLIGRAM(S): at 21:09

## 2021-07-08 NOTE — PROGRESS NOTE ADULT - ASSESSMENT
IMPRESSION:  Acute MI s/p PCI to RCA on 7/7/2021  HTN  DLD  Hx of CAD s/p stent to RCA in 2011  BPH    PLAN:    CNS: Avoid sedation     HEENT:  Oral care    PULMONARY:  HOB @ 45 degrees, Keep SPO2>94%    CARDIOVASCULAR:  - Continue CCU monitoring  - Aspirin 81mg daily, Prasugrel 10mg daily, Atorvastatin 80mg daily  - Increase Metoprolol to 25mg q8h  - Continue Lisinopril  - Check 2D echo  - Trend cardiac enzymes until stable / down trending  - F/U HbA1c    GI: GI prophylaxis: Protonix    Feeding    RENAL:  F/u renal function and lytes.  Correct as needed. accurate I/O    INFECTIOUS DISEASE: No indications for Abx. Monitor VS    HEMATOLOGICAL:  DVT prophylaxis    ENDOCRINE:  Follow up FS.  Insulin protocol if needed. F/U HbA1c    MUSCULOSKELETAL: Increase as tolerated    CODE STATUS: FULL CODE

## 2021-07-08 NOTE — PROGRESS NOTE ADULT - SUBJECTIVE AND OBJECTIVE BOX
PATIENT:  GLENN ARGUETA  [id number]    CHIEF COMPLAINT:  Patient is a 56y old  Male who presents with a chief complaint of chest pain (2021 18:51)      INTERVAL HISTORY/OVERNIGHT EVENTS:  Pt had an episode of vtach overnight, converted spontaneously, cath yesterday. Today no complaints, no chest pain, sob, abdominal pain, ha, nausea, vomiting, leg pain.      MEDICATIONS:  MEDICATIONS  (STANDING):  aspirin  chewable 81 milliGRAM(s) Oral daily  atorvastatin 80 milliGRAM(s) Oral at bedtime  chlorhexidine 4% Liquid 1 Application(s) Topical <User Schedule>  lisinopril Oral Tab/Cap - Peds 2.5 milliGRAM(s) Oral daily  metoprolol tartrate 25 milliGRAM(s) Oral every 12 hours  prasugrel 10 milliGRAM(s) Oral daily  sodium chloride 0.9%. 1000 milliLiter(s) (150 mL/Hr) IV Continuous <Continuous>  tamsulosin 0.4 milliGRAM(s) Oral at bedtime    MEDICATIONS  (PRN):      ALLERGIES:  Allergies    No Known Allergies    Intolerances        OBJECTIVE:  ICU Vital Signs Last 24 Hrs  T(C): 36.6 (2021 04:00), Max: 36.7 (2021 17:30)  T(F): 97.8 (2021 04:00), Max: 98 (2021 17:30)  HR: 78 (2021 07:00) (66 - 84)  BP: 155/102 (2021 07:00) (123/78 - 155/102)  BP(mean): 123 (2021 07:00) (85 - 126)  ABP: --  ABP(mean): --  RR: 18 (2021 07:00) (12 - 19)  SpO2: 98% (2021 07:00) (95% - 100%)      Adult Advanced Hemodynamics Last 24 Hrs  CVP(mm Hg): --  CVP(cm H2O): --  CO: --  CI: --  PA: --  PA(mean): --  PCWP: --  SVR: --  SVRI: --  PVR: --  PVRI: --  CAPILLARY BLOOD GLUCOSE      POCT Blood Glucose.: 99 mg/dL (2021 22:55)    CAPILLARY BLOOD GLUCOSE      POCT Blood Glucose.: 99 mg/dL (2021 22:55)    I&O's Summary    2021 07:01  -  2021 07:00  --------------------------------------------------------  IN: 1300 mL / OUT: 1230 mL / NET: 70 mL      Daily Height in cm: 175.26 (2021 22:55)    Daily Weight in k.5 (2021 05:00)    PHYSICAL EXAMINATION:  General: WN/WD NAD  HEENT: EOMI, moist mucous membranes  Neurology: A&Ox3, nonfocal  Respiratory: CTA B/L, normal respiratory effort, no wheezes, crackles, rales  CV: RRR, S1S2, no murmurs, rubs or gallops  Abdominal: Soft, NT, ND  Extremities: No edema, + peripheral pulses      LABS:                          14.2   11.49 )-----------( 298      ( 2021 04:39 )             43.0     08    134<L>  |  101  |  8<L>  ----------------------------<  93  4.2   |  24  |  0.9    Ca    8.6      2021 04:39  Mg     2.0         TPro  6.4  /  Alb  3.7  /  TBili  0.5  /  DBili  x   /  AST  81<H>  /  ALT  31  /  AlkPhos  65  0708    LIVER FUNCTIONS - ( 2021 04:39 )  Alb: 3.7 g/dL / Pro: 6.4 g/dL / ALK PHOS: 65 U/L / ALT: 31 U/L / AST: 81 U/L / GGT: x           PT/INR - ( 2021 17:41 )   PT: 11.60 sec;   INR: 1.01 ratio         PTT - ( 2021 04:39 )  PTT:31.3 sec  Troponin T, Serum: 1.80 ng/mL ( @ 04:39)  Troponin T, Serum: 0.01 ng/mL ( @ 20:16)  Troponin T, Serum: <0.01 ng/mL ( @ 17:32)    CARDIAC MARKERS ( 2021 04:39 )  x     / 1.80 ng/mL / x     / x     / x      CARDIAC MARKERS ( 2021 20:16 )  x     / 0.01 ng/mL / x     / x     / x      CARDIAC MARKERS ( 2021 17:32 )  x     / <0.01 ng/mL / x     / x     / x              TELEMETRY:     EKG:       IMAGING:  Echo:        LVSF:        EF:        RVSF:        LA:        RA:        Mitral Valve:        Aortic Valve:       Tricuspid Valve:        Pulmonic Valve:        Pericardium:     ASSESSMENT & PLAN:

## 2021-07-08 NOTE — PROGRESS NOTE ADULT - SUBJECTIVE AND OBJECTIVE BOX
Cardiology Follow up    GLENN ARGUETA   56y Male  PAST MEDICAL & SURGICAL HISTORY:  Renal stone    HTN (hypertension)    HLD (hyperlipidemia)    CAD (coronary artery disease)    History of coronary artery stent placement         HPI:  This is a 56 year old male patient with PMHx of CAD s/p PCI to RCA in 2011, HLD, HTN presented to the ED with a 1-hour history of chest pain. History goes back to 1 hour prior to presentation when the patient suddenly developed chest pain described as severe chest tightness, retrosternal radiating to the right side, associated with diaphoresis, similar to the pain he had in 2011 when he had PCI. In EMS, he was given full dose of aspirin and nitro with slight improvement of chest pain.  He denies shortness of breath, abdominal pain, or any other significant symptoms. He reports that while he was in nursing home in 12/2020, he was taken off of aspirin and simvastatin at that time and did not restart taking these medications after he was released.     Patient was evaluated in the ED, still complaining of chest tightness ( slightly improved after starting the nitro drip ).     In the ED Vital Signs Last 24 Hrs  T(C): 36.7 (07 Jul 2021 17:30), Max: 36.7 (07 Jul 2021 17:30)  T(F): 98 (07 Jul 2021 17:30), Max: 98 (07 Jul 2021 17:30)  BP(mean): --  RR: 18 (07 Jul 2021 17:30) (18 - 18)  SpO2: 99% (07 Jul 2021 17:30) (99% - 99%) (07 Jul 2021 18:51)    Allergies    No Known Allergies    Intolerances    Patient seen and examined at bedside. No acute events overnight.  Patient without complaints. Pt ambulated without issues/symptoms  Denies CP, SOB, palpitations, or dizziness  No events on telemetry overnight    Vital Signs Last 24 Hrs  T(C): 36.5 (08 Jul 2021 12:00), Max: 36.7 (07 Jul 2021 17:30)  T(F): 97.7 (08 Jul 2021 12:00), Max: 98.1 (08 Jul 2021 08:00)  HR: 78 (08 Jul 2021 12:00) (66 - 88)  BP: 140/83 (08 Jul 2021 12:00) (123/78 - 155/102)  BP(mean): 111 (08 Jul 2021 12:00) (85 - 126)  RR: 20 (08 Jul 2021 12:00) (12 - 23)  SpO2: 96% (08 Jul 2021 12:00) (95% - 100%)    MEDICATIONS  (STANDING):  aspirin  chewable 81 milliGRAM(s) Oral daily  atorvastatin 80 milliGRAM(s) Oral at bedtime  chlorhexidine 4% Liquid 1 Application(s) Topical <User Schedule>  enoxaparin Injectable 40 milliGRAM(s) SubCutaneous at bedtime  lisinopril Oral Tab/Cap - Peds 2.5 milliGRAM(s) Oral daily  metoprolol tartrate 25 milliGRAM(s) Oral every 8 hours  prasugrel 10 milliGRAM(s) Oral daily  sodium chloride 0.9%. 1000 milliLiter(s) (150 mL/Hr) IV Continuous <Continuous>  tamsulosin 0.4 milliGRAM(s) Oral at bedtime    MEDICATIONS  (PRN):    REVIEW OF SYSTEMS:          All negative except as mentioned in HPI    PHYSICAL EXAM:           CONSTITUTIONAL: Well-developed; well-nourished; in no acute distress  	SKIN: warm, dry  	HEAD: Normocephalic; atraumatic  	EYES: PERRL.  	ENT: No nasal discharge, airway clear, mucous membranes moist  	NECK: Supple; non tender.  	CARD: +S1, +S2, no murmurs, gallops, or rubs. Regular rate and rhythm    	RESP: No wheezes, rales or rhonchi. CTA B/L  	ABD: soft ntnd, + BS x 4 quadrants  	EXT: moves all extremities,  no clubbing, cyanosis or edema  	NEURO: Alert and oriented x3, no focal deficits          PSYCH: Cooperative, appropriate          VASCULAR:  + Rad / + PTs / +  DPs          EXTREMITY:              Right Radial: dressing removed, access site soft, no hematoma, no pain, + pulses, no sign of infection, no numbness            ECG:   < from: 12 Lead ECG (07.08.21 @ 05:08) >  Ventricular Rate 73 BPM    Atrial Rate 73 BPM    P-R Interval 174 ms    QRS Duration 90 ms    Q-T Interval 382 ms    QTC Calculation(Bazett) 420 ms    P Axis 72 degrees    R Axis -17 degrees    T Axis 59 degrees    Diagnosis Line Sinus rhythm with occasional Premature ventricular complexes with ventricular  escape complexes  Inferior infarct , age undetermined  Abnormal ECG    Confirmed by Royzman, Andrew ([id number]) on 7/8/2021 7:40:57 AM                                                                                    2D ECHO:  P    LABS:                        14.2   11.49 )-----------( 298      ( 08 Jul 2021 04:39 )             43.0     07-08    134<L>  |  101  |  8<L>  ----------------------------<  93  4.2   |  24  |  0.9    Ca    8.6      08 Jul 2021 04:39  Mg     2.0     07-07    TPro  6.4  /  Alb  3.7  /  TBili  0.5  /  DBili  x   /  AST  81<H>  /  ALT  31  /  AlkPhos  65  07-08    CARDIAC MARKERS ( 08 Jul 2021 04:39 )  x     / 1.80 ng/mL / x     / x     / x      CARDIAC MARKERS ( 07 Jul 2021 20:16 )  x     / 0.01 ng/mL / x     / x     / x      CARDIAC MARKERS ( 07 Jul 2021 17:32 )  x     / <0.01 ng/mL / x     / x     / x        LDL Cholesterol Calculated: 127 mg/dL   1C with Estimated Average Glucose Result: 6.6    Magnesium, Serum: 2.0 mg/dL [1.8 - 2.4] (07-07-21 @ 17:32)  LIVER FUNCTIONS - ( 08 Jul 2021 04:39 )  Alb: 3.7 g/dL / Pro: 6.4 g/dL / ALK PHOS: 65 U/L / ALT: 31 U/L / AST: 81 U/L / GGT: x             A/P:  I discussed the case with Cardiologist Dr. Hansen and recommend the following:    S/P PCI:   CORONARIES:    Dominance- right  Left main-  no disease   LAD- moderate atherosclerosis  Diagonal- mild disease   LCX- mild disease   Obtuse marginal- no disease   RCA- 80% diffuse stenosis in prox mid RCA. 100% thrombotic occlusion of distal RCA. Patent prior stent distal to the lesion.   RPDA- no disease. patent prior stent   RPL- no disease     PROCEDURE SUMMARY  Successful PCI of proximal, mid and distal RCA with CHRIS x 2 (AUC score 8)                      Care as per CCU team                               OOB to chair, ambulate with assistance                    f/u 2D Echo results   	     Continue DAPT ( Aspirin 81 mg PO Daily and Effient 10 mg PO Daily ), ACEi, B-Blocker, Statin Therapy                   Patient pharmacy called in for Effient prescription and it is 1$ per month; home Plavix D/C                    Monitor LFT's                   Keep K = 4, Mg = 2                   GI / DVT prophylaxis                    Patient agreeing to take DAPT for at least one year or as directed by cardiologist                    Pt given instructions on importance of taking antiplatelet medication or risk acute stent thrombosis/death                   Post cath instructions, access site care and activity restrictions reviewed with patient                     Discussed with patient to return to hospital if experience chest pain, shortness breath, dizziness and site bleeding                   Aggressive risk factor modification, diet counseling, smoking cessation discussed with patient                       Cardiac rehab information provided/ referral and communication to cardiac rehab provided                   Follow up with Cardiology Dr. Hansen regarding patient disposition.

## 2021-07-08 NOTE — PROGRESS NOTE ADULT - ATTENDING COMMENTS
cad/ami  s/p pci of rca with miguel a  cont dapt  2d echo  ambulate as tolerated  aggressive risk modif

## 2021-07-08 NOTE — CHART NOTE - NSCHARTNOTEFT_GEN_A_CORE
Preliminary Cardiac Catheterization Post-Procedure Report    PRE-OP DIAGNOSIS: ACS    PROCEDURE: Coronary angiogram, PCI    Attending: Dr. Hansen   Fellow: Dr. Sepulveda, Dr. Luis    ANESTHESIA TYPE  [  ]General Anesthesia  [ x ] Sedation  [ x ] Local/Regional    ESTIMATED BLOOD LOSS:   < 10 mL    CONDITION  [  ] Critical  [  ] Serious  [  ]Fair  [ x ]Good    ACCESS & HEMOSTASIS  [ x ] Right radial  -> D stat  [  ] Right femoral  [  ] Left radial  [  ] Left femoral       FINDINGS    LVEF- mildly elevated                         CORONARIES:    Dominance- right  Left main-  no disease   LAD- mild to moderate atherosclerosis  Diagonal- mild disease   LCX- mild disease   Obtuse marginal- no disease   RCA- 80% diffuse stenosis in prox mid RCA. 100% thrombotic occlusion of distal RCA. Patent prior stent distal to the lesion.   RPDA- no disease. patent prior stent   RPL- no disease       PROCEDURE SUMMARY  Successful PCI of proximal, mid and distal RCA with CHRIS x 2 (AUC score 8)      RECOMMENDATIONS  -CU monitoring   -IV hydration post procedure   -Aggressive medical therapy including DAPT and risk factor modification Preliminary Cardiac Catheterization Post-Procedure Report    PRE-OP DIAGNOSIS: ACS    PROCEDURE: Coronary angiogram, PCI    Attending: Dr. Hansen   Fellow: Dr. Sepulveda, Dr. Luis    ANESTHESIA TYPE  [  ]General Anesthesia  [ x ] Sedation  [ x ] Local/Regional    ESTIMATED BLOOD LOSS:   < 10 mL    CONDITION  [  ] Critical  [  ] Serious  [  ]Fair  [ x ]Good    ACCESS & HEMOSTASIS  [ x ] Right radial  -> D stat  [  ] Right femoral  [  ] Left radial  [  ] Left femoral       FINDINGS    LVEF- mildly elevated                         CORONARIES:    Dominance- right  Left main-  no disease   LAD- moderate atherosclerosis  Diagonal- mild disease   LCX- mild disease   Obtuse marginal- no disease   RCA- 80% diffuse stenosis in prox mid RCA. 100% thrombotic occlusion of distal RCA. Patent prior stent distal to the lesion.   RPDA- no disease. patent prior stent   RPL- no disease       PROCEDURE SUMMARY  Successful PCI of proximal, mid and distal RCA with CHRIS x 2 (AUC score 8)      RECOMMENDATIONS  -CU monitoring   -IV hydration post procedure   -Aggressive medical therapy including DAPT and risk factor modification

## 2021-07-09 ENCOUNTER — TRANSCRIPTION ENCOUNTER (OUTPATIENT)
Age: 56
End: 2021-07-09

## 2021-07-09 VITALS
DIASTOLIC BLOOD PRESSURE: 88 MMHG | RESPIRATION RATE: 20 BRPM | OXYGEN SATURATION: 97 % | HEART RATE: 84 BPM | SYSTOLIC BLOOD PRESSURE: 146 MMHG

## 2021-07-09 LAB
ALBUMIN SERPL ELPH-MCNC: 4 G/DL — SIGNIFICANT CHANGE UP (ref 3.5–5.2)
ALP SERPL-CCNC: 66 U/L — SIGNIFICANT CHANGE UP (ref 30–115)
ALT FLD-CCNC: 33 U/L — SIGNIFICANT CHANGE UP (ref 0–41)
ANION GAP SERPL CALC-SCNC: 10 MMOL/L — SIGNIFICANT CHANGE UP (ref 7–14)
AST SERPL-CCNC: 59 U/L — HIGH (ref 0–41)
BILIRUB SERPL-MCNC: 0.4 MG/DL — SIGNIFICANT CHANGE UP (ref 0.2–1.2)
BUN SERPL-MCNC: 11 MG/DL — SIGNIFICANT CHANGE UP (ref 10–20)
CALCIUM SERPL-MCNC: 9 MG/DL — SIGNIFICANT CHANGE UP (ref 8.5–10.1)
CHLORIDE SERPL-SCNC: 102 MMOL/L — SIGNIFICANT CHANGE UP (ref 98–110)
CO2 SERPL-SCNC: 27 MMOL/L — SIGNIFICANT CHANGE UP (ref 17–32)
CREAT SERPL-MCNC: 1 MG/DL — SIGNIFICANT CHANGE UP (ref 0.7–1.5)
GLUCOSE SERPL-MCNC: 96 MG/DL — SIGNIFICANT CHANGE UP (ref 70–99)
HCT VFR BLD CALC: 43.4 % — SIGNIFICANT CHANGE UP (ref 42–52)
HGB BLD-MCNC: 14.1 G/DL — SIGNIFICANT CHANGE UP (ref 14–18)
MAGNESIUM SERPL-MCNC: 1.9 MG/DL — SIGNIFICANT CHANGE UP (ref 1.8–2.4)
MCHC RBC-ENTMCNC: 30.5 PG — SIGNIFICANT CHANGE UP (ref 27–31)
MCHC RBC-ENTMCNC: 32.5 G/DL — SIGNIFICANT CHANGE UP (ref 32–37)
MCV RBC AUTO: 93.7 FL — SIGNIFICANT CHANGE UP (ref 80–94)
NRBC # BLD: 0 /100 WBCS — SIGNIFICANT CHANGE UP (ref 0–0)
PLATELET # BLD AUTO: 288 K/UL — SIGNIFICANT CHANGE UP (ref 130–400)
POTASSIUM SERPL-MCNC: 4.3 MMOL/L — SIGNIFICANT CHANGE UP (ref 3.5–5)
POTASSIUM SERPL-SCNC: 4.3 MMOL/L — SIGNIFICANT CHANGE UP (ref 3.5–5)
PROT SERPL-MCNC: 6.6 G/DL — SIGNIFICANT CHANGE UP (ref 6–8)
RBC # BLD: 4.63 M/UL — LOW (ref 4.7–6.1)
RBC # FLD: 13.2 % — SIGNIFICANT CHANGE UP (ref 11.5–14.5)
SODIUM SERPL-SCNC: 139 MMOL/L — SIGNIFICANT CHANGE UP (ref 135–146)
TROPONIN T SERPL-MCNC: 1.42 NG/ML — CRITICAL HIGH
WBC # BLD: 9.86 K/UL — SIGNIFICANT CHANGE UP (ref 4.8–10.8)
WBC # FLD AUTO: 9.86 K/UL — SIGNIFICANT CHANGE UP (ref 4.8–10.8)

## 2021-07-09 PROCEDURE — 93010 ELECTROCARDIOGRAM REPORT: CPT

## 2021-07-09 PROCEDURE — 99231 SBSQ HOSP IP/OBS SF/LOW 25: CPT

## 2021-07-09 RX ORDER — ATORVASTATIN CALCIUM 80 MG/1
1 TABLET, FILM COATED ORAL
Qty: 90 | Refills: 2
Start: 2021-07-09 | End: 2022-04-04

## 2021-07-09 RX ORDER — METOPROLOL TARTRATE 50 MG
1 TABLET ORAL
Qty: 30 | Refills: 3
Start: 2021-07-09 | End: 2021-11-05

## 2021-07-09 RX ORDER — TAMSULOSIN HYDROCHLORIDE 0.4 MG/1
1 CAPSULE ORAL
Qty: 15 | Refills: 0
Start: 2021-07-09 | End: 2021-08-07

## 2021-07-09 RX ORDER — ASPIRIN/CALCIUM CARB/MAGNESIUM 324 MG
1 TABLET ORAL
Qty: 90 | Refills: 3
Start: 2021-07-09 | End: 2022-07-03

## 2021-07-09 RX ORDER — LISINOPRIL 2.5 MG/1
1 TABLET ORAL
Qty: 90 | Refills: 2
Start: 2021-07-09 | End: 2022-04-04

## 2021-07-09 RX ORDER — METOPROLOL TARTRATE 50 MG
1 TABLET ORAL
Qty: 90 | Refills: 1
Start: 2021-07-09 | End: 2022-01-04

## 2021-07-09 RX ORDER — PRASUGREL 5 MG/1
1 TABLET, FILM COATED ORAL
Qty: 90 | Refills: 1
Start: 2021-07-09 | End: 2022-01-04

## 2021-07-09 RX ORDER — LISINOPRIL 2.5 MG/1
1 TABLET ORAL
Qty: 0 | Refills: 0 | DISCHARGE

## 2021-07-09 RX ADMIN — CHLORHEXIDINE GLUCONATE 1 APPLICATION(S): 213 SOLUTION TOPICAL at 06:02

## 2021-07-09 RX ADMIN — Medication 81 MILLIGRAM(S): at 11:07

## 2021-07-09 RX ADMIN — LISINOPRIL 2.5 MILLIGRAM(S): 2.5 TABLET ORAL at 06:02

## 2021-07-09 RX ADMIN — Medication 25 MILLIGRAM(S): at 13:24

## 2021-07-09 RX ADMIN — Medication 25 MILLIGRAM(S): at 06:02

## 2021-07-09 RX ADMIN — PRASUGREL 10 MILLIGRAM(S): 5 TABLET, FILM COATED ORAL at 11:07

## 2021-07-09 NOTE — PROGRESS NOTE ADULT - SUBJECTIVE AND OBJECTIVE BOX
PATIENT:  GLENN ARGUETA  [id number]    CHIEF COMPLAINT:  Patient is a 56y old  Male who presents with a chief complaint of chest pain (2021 13:02)      INTERVAL HISTORY/OVERNIGHT EVENTS:  no events overnight. pt seen and examined at bedside, no complaints. No chest pain, sob, abdominal pain, leg pain, nausea, vomiting,.        MEDICATIONS:  MEDICATIONS  (STANDING):  aspirin  chewable 81 milliGRAM(s) Oral daily  atorvastatin 80 milliGRAM(s) Oral at bedtime  chlorhexidine 4% Liquid 1 Application(s) Topical <User Schedule>  enoxaparin Injectable 40 milliGRAM(s) SubCutaneous at bedtime  lisinopril Oral Tab/Cap - Peds 2.5 milliGRAM(s) Oral daily  metoprolol tartrate 25 milliGRAM(s) Oral every 8 hours  prasugrel 10 milliGRAM(s) Oral daily  sodium chloride 0.9%. 1000 milliLiter(s) (150 mL/Hr) IV Continuous <Continuous>  tamsulosin 0.4 milliGRAM(s) Oral at bedtime    MEDICATIONS  (PRN):      ALLERGIES:  Allergies    No Known Allergies    Intolerances        OBJECTIVE:  ICU Vital Signs Last 24 Hrs  T(C): 37 (2021 04:00), Max: 37 (2021 04:00)  T(F): 98.6 (2021 04:00), Max: 98.6 (2021 04:00)  HR: 68 (2021 07:00) (64 - 88)  BP: 140/85 (2021 07:00) (112/60 - 156/87)  BP(mean): 101 (2021 07:00) (74 - 111)  ABP: --  ABP(mean): --  RR: 20 (2021 07:00) (12 - 23)  SpO2: 98% (2021 07:00) (95% - 98%)      Adult Advanced Hemodynamics Last 24 Hrs  CVP(mm Hg): --  CVP(cm H2O): --  CO: --  CI: --  PA: --  PA(mean): --  PCWP: --  SVR: --  SVRI: --  PVR: --  PVRI: --  CAPILLARY BLOOD GLUCOSE        CAPILLARY BLOOD GLUCOSE      POCT Blood Glucose.: 99 mg/dL (2021 22:55)    I&O's Summary    2021 07:01  -  2021 07:00  --------------------------------------------------------  IN: 1040 mL / OUT: 2420 mL / NET: -1380 mL      Daily     Daily Weight in k.8 (2021 06:00)    PHYSICAL EXAMINATION:  General: WN/WD NAD  HEENT:  EOMI, moist mucous membranes  Neurology: A&Ox3, nonfocal  Respiratory: CTA B/L, normal respiratory effort, no wheezes, crackles, rales  CV: RRR, S1S2, no murmurs, rubs or gallops  Abdominal: Soft, NT, ND   Extremities: No edema,       LABS:                          14.1   9.86  )-----------( 288      ( 2021 04:40 )             43.4         139  |  102  |  11  ----------------------------<  96  4.3   |  27  |  1.0    Ca    9.0      2021 04:40  Mg     1.9         TPro  6.6  /  Alb  4.0  /  TBili  0.4  /  DBili  x   /  AST  59<H>  /  ALT  33  /  AlkPhos  66  09    LIVER FUNCTIONS - ( 2021 04:40 )  Alb: 4.0 g/dL / Pro: 6.6 g/dL / ALK PHOS: 66 U/L / ALT: 33 U/L / AST: 59 U/L / GGT: x           PT/INR - ( 2021 17:41 )   PT: 11.60 sec;   INR: 1.01 ratio         PTT - ( 2021 04:39 )  PTT:31.3 sec  Troponin T, Serum: 1.42 ng/mL ( @ 04:40)  Troponin T, Serum: 1.99 ng/mL ( @ 11:13)    CARDIAC MARKERS ( 2021 04:40 )  x     / 1.42 ng/mL / x     / x     / x      CARDIAC MARKERS ( 2021 11:13 )  x     / 1.99 ng/mL / x     / x     / x      CARDIAC MARKERS ( 2021 04:39 )  x     / 1.80 ng/mL / x     / x     / x      CARDIAC MARKERS ( 2021 20:16 )  x     / 0.01 ng/mL / x     / x     / x      CARDIAC MARKERS ( 2021 17:32 )  x     / <0.01 ng/mL / x     / x     / x              TELEMETRY:     EK Lead ECG:   Ventricular Rate 73 BPM    Atrial Rate 73 BPM    P-R Interval 174 ms    QRS Duration 90 ms    Q-T Interval 382 ms    QTC Calculation(Bazett) 420 ms    P Axis 72 degrees    R Axis -17 degrees    T Axis 59 degrees    Diagnosis Line Sinus rhythm with occasional Premature ventricular complexes with ventricular  escape complexes  Inferior infarct , age undetermined  Abnormal ECG    Confirmed by Andrew Green ([id number]) on 2021 7:40:57 AM (21 @ 05:08)      IMAGING:  Echo:        LVSF:        EF:        RVSF:        LA:        RA:        Mitral Valve:        Aortic Valve:       Tricuspid Valve:        Pulmonic Valve:        Pericardium:     ASSESSMENT & PLAN:           PATIENT:  GLENN ARGUETA  [id number]    CHIEF COMPLAINT:  Patient is a 56y old  Male who presents with a chief complaint of chest pain (2021 13:02)      INTERVAL HISTORY/OVERNIGHT EVENTS:  no events overnight. pt seen and examined at bedside, no complaints. No chest pain, sob, abdominal pain, leg pain, nausea, vomiting,.        MEDICATIONS:  MEDICATIONS  (STANDING):  aspirin  chewable 81 milliGRAM(s) Oral daily  atorvastatin 80 milliGRAM(s) Oral at bedtime  chlorhexidine 4% Liquid 1 Application(s) Topical <User Schedule>  enoxaparin Injectable 40 milliGRAM(s) SubCutaneous at bedtime  lisinopril Oral Tab/Cap - Peds 2.5 milliGRAM(s) Oral daily  metoprolol tartrate 25 milliGRAM(s) Oral every 8 hours  prasugrel 10 milliGRAM(s) Oral daily  sodium chloride 0.9%. 1000 milliLiter(s) (150 mL/Hr) IV Continuous <Continuous>  tamsulosin 0.4 milliGRAM(s) Oral at bedtime    MEDICATIONS  (PRN):      ALLERGIES:  Allergies    No Known Allergies    Intolerances        OBJECTIVE:  ICU Vital Signs Last 24 Hrs  T(C): 37 (2021 04:00), Max: 37 (2021 04:00)  T(F): 98.6 (2021 04:00), Max: 98.6 (2021 04:00)  HR: 68 (2021 07:00) (64 - 88)  BP: 140/85 (2021 07:00) (112/60 - 156/87)  BP(mean): 101 (2021 07:00) (74 - 111)  ABP: --  ABP(mean): --  RR: 20 (2021 07:00) (12 - 23)  SpO2: 98% (2021 07:00) (95% - 98%)      Adult Advanced Hemodynamics Last 24 Hrs  CVP(mm Hg): --  CVP(cm H2O): --  CO: --  CI: --  PA: --  PA(mean): --  PCWP: --  SVR: --  SVRI: --  PVR: --  PVRI: --  CAPILLARY BLOOD GLUCOSE        CAPILLARY BLOOD GLUCOSE      POCT Blood Glucose.: 99 mg/dL (2021 22:55)    I&O's Summary    2021 07:01  -  2021 07:00  --------------------------------------------------------  IN: 1040 mL / OUT: 2420 mL / NET: -1380 mL      Daily     Daily Weight in k.8 (2021 06:00)    PHYSICAL EXAMINATION:  General: WN/WD NAD  HEENT:  EOMI, moist mucous membranes  Neurology: A&Ox3, nonfocal  Respiratory: CTA B/L, normal respiratory effort, no wheezes, crackles, rales  CV: RRR, S1S2, no murmurs, rubs or gallops  Abdominal: Soft, NT, ND   Extremities: No edema,       LABS:                          14.1   9.86  )-----------( 288      ( 2021 04:40 )             43.4         139  |  102  |  11  ----------------------------<  96  4.3   |  27  |  1.0    Ca    9.0      2021 04:40  Mg     1.9         TPro  6.6  /  Alb  4.0  /  TBili  0.4  /  DBili  x   /  AST  59<H>  /  ALT  33  /  AlkPhos  66  09    LIVER FUNCTIONS - ( 2021 04:40 )  Alb: 4.0 g/dL / Pro: 6.6 g/dL / ALK PHOS: 66 U/L / ALT: 33 U/L / AST: 59 U/L / GGT: x           PT/INR - ( 2021 17:41 )   PT: 11.60 sec;   INR: 1.01 ratio         PTT - ( 2021 04:39 )  PTT:31.3 sec  Troponin T, Serum: 1.42 ng/mL ( @ 04:40)  Troponin T, Serum: 1.99 ng/mL ( @ 11:13)    CARDIAC MARKERS ( 2021 04:40 )  x     / 1.42 ng/mL / x     / x     / x      CARDIAC MARKERS ( 2021 11:13 )  x     / 1.99 ng/mL / x     / x     / x      CARDIAC MARKERS ( 2021 04:39 )  x     / 1.80 ng/mL / x     / x     / x      CARDIAC MARKERS ( 2021 20:16 )  x     / 0.01 ng/mL / x     / x     / x      CARDIAC MARKERS ( 2021 17:32 )  x     / <0.01 ng/mL / x     / x     / x              TELEMETRY:     EK Lead ECG:   Ventricular Rate 73 BPM    Atrial Rate 73 BPM    P-R Interval 174 ms    QRS Duration 90 ms    Q-T Interval 382 ms    QTC Calculation(Bazett) 420 ms    P Axis 72 degrees    R Axis -17 degrees    T Axis 59 degrees    Diagnosis Line Sinus rhythm with occasional Premature ventricular complexes with ventricular  escape complexes  Inferior infarct , age undetermined  Abnormal ECG    Confirmed by Andrew Green ([id number]) on 2021 7:40:57 AM (21 @ 05:08)      IMAGING:  Echo:   < from: TTE Echo Complete w/ Contrast w/ Doppler (21 @ 10:37) >  Summary:   1. Left ventricular ejection fraction, by visual estimation, is 55 to 60%.   2. Mildly increased LV wall thickness.   3. Spectral Doppler shows impaired relaxation pattern of left ventricular myocardial filling(Grade I diastolic dysfunction).   4. Normal right ventricular size and function.   5. Normal left atrial size.   6. Normal right atrial size.   7. No evidence of mitral valve regurgitation.   8. Mild tricuspid regurgitation.      < end of copied text >

## 2021-07-09 NOTE — DISCHARGE NOTE PROVIDER - CARE PROVIDERS DIRECT ADDRESSES
[email or web address]

## 2021-07-09 NOTE — DISCHARGE NOTE NURSING/CASE MANAGEMENT/SOCIAL WORK - PATIENT PORTAL LINK FT
You can access the FollowMyHealth Patient Portal offered by [facility] by registering at the following website: [email or web address]. By joining [employer]’s FollowMyHealth portal, you will also be able to view your health information using other applications (apps) compatible with our system.

## 2021-07-09 NOTE — DISCHARGE NOTE PROVIDER - NSDCCPCAREPLAN_GEN_ALL_CORE_FT
PRINCIPAL DISCHARGE DIAGNOSIS  Diagnosis: NSTEMI (non-ST elevation myocardial infarction)  Assessment and Plan of Treatment: You have had a heart attack (acute myocardial infarction). A heart attack occurs when a vessel that sends blood to your heart suddenly becomes blocked. This causes your heart not to work as well as it should. You had a procedure called cardiac catheterization which involves passing a thin flexible tube (catheter) into the heart.   If you had a blockage, you may have had angioplasty and a stent placed in your heart during the procedure.  What to Expect at Home:  You may have bruising and pain in your groin or arm where the catheter was placed.   Walking short distances on a flat surface is OK. Limit going up and downstairs to around twice a day for the first 2 to 3 days.   DO NOT do any heavy pushing, exercise, or sexual activity for 2 to 5 days.   DO NOT take a bath or swim for the first week. You may take showers, but please protect the incision site.   Your provider will tell you how often to change your dressing.   If your incision bleeds, lie down and put pressure on it for 30 minutes.   Many people take medicine to thin the blood to prevent a blood clot that can lead to a heart attack. Take the medications exactly as your provider tells you. DO NOT stop taking them without talking to your provider.  Call your provider if:  - There is bleeding at the catheter insertion site that does not stop when you apply pressure  - Your arm or leg below where the catheter was inserted changes color, is cool to the touch, or is numb  - The small incision for your catheter becomes red or painful, or yellow or green discharge is draining from it  - You have chest pain or shortness of breath that does not go away with rest  -Your pulse feels irregular, it is very slow  or very fast  - You have dizziness, fainting, fatigue, coughing blood      SECONDARY DISCHARGE DIAGNOSES  Diagnosis: Diabetes mellitus  Assessment and Plan of Treatment: Diabetes is a chronic condition caused by high blood sugar levels. Your blood sugar levels become high because your body does not have enough insulin. Insulin helps move sugar out of the blood so it can be used for energy. Increased sugar in your blood can cause problems in several organs of your body including but not limited to your blood vessels, your kidneys, your brain, and your eyes. The lack of insulin forces your body to use fat instead of sugar for energy. This can be dangerous if not controlled.  Seek Medical Attention If:  You have a seizure.  You begin to breathe fast, or are short of breath.  You become weak and confused.  You are more drowsy than usual.  You have fruity, sweet breath.  You have severe, new stomach pain and are vomiting.  Your blood sugar level is lower or higher than your healthcare provider says it should be.  You have ketones in your blood or urine.  You have a fever or chills.  You are more thirsty than usual.  You are urinating more often than usual.  You have questions or concerns about your condition or care.  Insulin and diabetes medicine decreases the amount of sugar in your blood.  The best way to prevent problems from Diabetes is to control your blood sugars.   Monitor your blood sugar levels closely. Take your diabetes medications as directed by your physician.   Speak with your doctor and/or a nutritionist about the best way to change your lifestyle and dietary habits to avoid any problems from Diabetes in the future.

## 2021-07-09 NOTE — DISCHARGE NOTE PROVIDER - HOSPITAL COURSE
56 y.o male patient with PMH of CAD s/p PCI to RCA in 2011, HLD, HTN presented to the ED with a 1-hour history of chest pain. Patient suddenly developed chest pain described as severe chest tightness, retrosternal radiating to the right side, associated with diaphoresis, similar to the pain he had in 2011 when he had PCI. In EMS, he was given full dose of aspirin and nitro with slight improvement of chest pain. He denied shortness of breath, abdominal pain, or any other significant symptoms. He reported that while he was in custodial in 12/2020, he was taken off of aspirin and simvastatin (unclear why).   Pt was taken for cath for continued chest pain and ischemic changes on ecg. Successful PCI of proximal, mid and distal RCA with CHRIS x 2, other coronaries showed mild to no disease. Pt started on aspirin, plavix,, atorvastatin, admitted to ccu for monitoring. Pt had a brief episode of non-sustained V-Tach with no symptoms, otherwise no further events on telemetry. Pt HbA1C found to be 6.6%, not previously diagnosed with diabetes, will f/u with PCP.

## 2021-07-09 NOTE — PROGRESS NOTE ADULT - ASSESSMENT
IMPRESSION:  Acute MI s/p PCI to RCA on 7/7/2021  HTN  DLD  Hx of CAD s/p stent to RCA in 2011  BPH    PLAN:    CNS: Avoid sedation     HEENT:  Oral care    PULMONARY:  HOB @ 45 degrees, Keep SPO2>94%    CARDIOVASCULAR:  - Aspirin 81mg daily, Prasugrel 10mg daily, Atorvastatin 80mg daily  - Switch Metoprolol tartrate to succinate 75mg daily  - Continue Lisinopril    GI: GI prophylaxis: Protonix    Feeding    RENAL:  F/u renal function and lytes.  Correct as needed. accurate I/O    INFECTIOUS DISEASE: No indications for Abx. Monitor VS    HEMATOLOGICAL:  DVT prophylaxis    ENDOCRINE:  Follow up FS.  Insulin protocol if needed.    MUSCULOSKELETAL: Increase as tolerated    CODE STATUS: FULL CODE   IMPRESSION:  Acute MI s/p PCI to RCA on 7/7/2021  HTN  DLD  Hx of CAD s/p stent to RCA in 2011  BPH    PLAN:    CNS: Avoid sedation     HEENT:  Oral care    PULMONARY:  HOB @ 45 degrees, Keep SPO2>94%    CARDIOVASCULAR:  - Aspirin 81mg daily, Prasugrel 10mg daily, Atorvastatin 80mg daily  - Switch Metoprolol tartrate to succinate 100mg daily  - Continue Lisinopril  - Patient stable for discharge home today with outpatient follow-up with cardiology  - Importance of medication and follow-up compliance explained to the patient and his family    GI: GI prophylaxis: Protonix    Feeding    RENAL:  F/u renal function and lytes.  Correct as needed. accurate I/O    INFECTIOUS DISEASE: No indications for Abx. Monitor VS    HEMATOLOGICAL:  DVT prophylaxis    ENDOCRINE:  Follow up FS.  Insulin protocol if needed. Will need outpatient follow-up with primary care physician for anti-diabetic regimen (HbA1c 6.6)    MUSCULOSKELETAL: Increase as tolerated    CODE STATUS: FULL CODE   IMPRESSION:  Acute MI s/p PCI to RCA on 7/7/2021  NL EF  HTN  DLD  Hx of CAD s/p stent to RCA in 2011  BPH    PLAN:    CNS: Avoid sedation     HEENT:  Oral care    PULMONARY:  HOB @ 45 degrees, Keep SPO2>94%    CARDIOVASCULAR:  - Aspirin 81mg daily, Prasugrel 10mg daily, Atorvastatin 80mg daily  - Switch Metoprolol tartrate to succinate 100mg daily  - Continue Lisinopril  - Patient stable for discharge home today with outpatient follow-up with cardiology  - Importance of medication and follow-up compliance explained to the patient and his family    GI: GI prophylaxis: Protonix    Feeding    RENAL:  F/u renal function and lytes.  Correct as needed. accurate I/O    INFECTIOUS DISEASE: No indications for Abx. Monitor VS    HEMATOLOGICAL:  DVT prophylaxis    ENDOCRINE:  Follow up FS.  Insulin protocol if needed. Will need outpatient follow-up with primary care physician for anti-diabetic regimen (HbA1c 6.6)    MUSCULOSKELETAL: Increase as tolerated    CODE STATUS: FULL CODE

## 2021-07-09 NOTE — DISCHARGE NOTE PROVIDER - PROVIDER TOKENS
PROVIDER:[TOKEN:[[id number]],FOLLOWUP:[1 week],ESTABLISHEDPATIENT:[T]],PROVIDER:[TOKEN:[[id number]],FOLLOWUP:[2 weeks]]

## 2021-07-09 NOTE — DISCHARGE NOTE PROVIDER - NSDCMRMEDTOKEN_GEN_ALL_CORE_FT
aspirin 81 mg oral tablet, chewable: 1 tab(s) orally once a day  atorvastatin 80 mg oral tablet: 1 tab(s) orally once a day (at bedtime)  Flomax 0.4 mg oral capsule: 1 cap(s) orally once a day  lisinopril 2.5 mg oral tablet: 1 tab(s) orally once a day  metoprolol succinate 100 mg oral tablet, extended release: 1 tab(s) orally once a day   prasugrel 10 mg oral tablet: 1 tab(s) orally once a day MDD:1  Zofran ODT 8 mg oral tablet, disintegratin tab(s) orally 2 times a day PRN nausea/vomiting

## 2021-07-09 NOTE — DISCHARGE NOTE PROVIDER - CARE PROVIDER_API CALL
Chano Garcia)  [address]  [address], [address]  [place] [address]  Phone: [phone]  Fax: [phone]  Established Patient  Follow Up Time: 1 week    Eric Hansen)  Cardiovascular Disease; Internal Medicine; Interventional Cardiology; Nuclear Cardiology  [address], [address]  [place] [address]  Phone: [phone]  Fax: [phone]  Follow Up Time: 2 weeks

## 2021-07-12 DIAGNOSIS — Z71.89 OTHER SPECIFIED COUNSELING: ICD-10-CM

## 2021-07-15 DIAGNOSIS — Z95.5 PRESENCE OF CORONARY ANGIOPLASTY IMPLANT AND GRAFT: ICD-10-CM

## 2021-07-15 DIAGNOSIS — R07.9 CHEST PAIN, UNSPECIFIED: ICD-10-CM

## 2021-07-15 DIAGNOSIS — E11.9 TYPE 2 DIABETES MELLITUS WITHOUT COMPLICATIONS: ICD-10-CM

## 2021-07-15 DIAGNOSIS — I21.4 NON-ST ELEVATION (NSTEMI) MYOCARDIAL INFARCTION: ICD-10-CM

## 2021-07-15 DIAGNOSIS — I47.2 VENTRICULAR TACHYCARDIA: ICD-10-CM

## 2021-07-15 DIAGNOSIS — E78.5 HYPERLIPIDEMIA, UNSPECIFIED: ICD-10-CM

## 2021-07-15 DIAGNOSIS — I25.110 ATHEROSCLEROTIC HEART DISEASE OF NATIVE CORONARY ARTERY WITH UNSTABLE ANGINA PECTORIS: ICD-10-CM

## 2021-07-15 DIAGNOSIS — F31.9 BIPOLAR DISORDER, UNSPECIFIED: ICD-10-CM

## 2021-07-15 DIAGNOSIS — I10 ESSENTIAL (PRIMARY) HYPERTENSION: ICD-10-CM

## 2021-07-15 DIAGNOSIS — N40.0 BENIGN PROSTATIC HYPERPLASIA WITHOUT LOWER URINARY TRACT SYMPTOMS: ICD-10-CM

## 2021-07-20 ENCOUNTER — APPOINTMENT (OUTPATIENT)
Dept: CARDIOLOGY | Facility: CLINIC | Age: 56
End: 2021-07-20

## 2021-07-30 ENCOUNTER — APPOINTMENT (OUTPATIENT)
Dept: CARDIOLOGY | Facility: CLINIC | Age: 56
End: 2021-07-30

## 2021-10-01 PROCEDURE — G9005: CPT

## 2022-11-08 NOTE — PATIENT PROFILE ADULT - FOOD INSECURITY
3rd attempt to reach out to patient to discuss testing and schedule, no answer, left VM with direct call back.    Max attempts, if pt wishes to still be seen, please call [phone]   no